# Patient Record
Sex: FEMALE | Race: WHITE
[De-identification: names, ages, dates, MRNs, and addresses within clinical notes are randomized per-mention and may not be internally consistent; named-entity substitution may affect disease eponyms.]

---

## 2017-09-30 ENCOUNTER — HOSPITAL ENCOUNTER (EMERGENCY)
Dept: HOSPITAL 17 - NEPK | Age: 42
Discharge: LEFT BEFORE DISCHARGE | End: 2017-09-30
Payer: SELF-PAY

## 2017-09-30 VITALS — WEIGHT: 154.32 LBS | HEIGHT: 65 IN | BODY MASS INDEX: 25.71 KG/M2

## 2017-09-30 VITALS
OXYGEN SATURATION: 98 % | RESPIRATION RATE: 15 BRPM | TEMPERATURE: 98.8 F | DIASTOLIC BLOOD PRESSURE: 67 MMHG | HEART RATE: 84 BPM | SYSTOLIC BLOOD PRESSURE: 145 MMHG

## 2017-09-30 DIAGNOSIS — M79.7: ICD-10-CM

## 2017-09-30 DIAGNOSIS — E11.40: Primary | ICD-10-CM

## 2017-09-30 PROCEDURE — 99281 EMR DPT VST MAYX REQ PHY/QHP: CPT

## 2017-09-30 NOTE — PD
HPI


Chief Complaint:  Pain: Acute or Chronic


Time Seen by Provider:  19:41


Travel History


International Travel<30 days:  No


Contact w/Intl Traveler<30days:  No


Traveled to known affect area:  No





History of Present Illness


HPI


42-year-old white female presents to emergency department complaining of 

neuropathy in her feet.  She states that she moved from [facility] 

to [place] 2 months ago.  She has no insurance or no doctor.  Patient 

states that she also has a history of fibromyalgia.  She has pain and altered 

sensation in her feet.  She states that she was on 15 mg of oxycodone twice 

daily.  She states that she takes no medication at this time.  He states that 

she has diet-controlled diabetes.  She also had received chemotherapy several 

years ago due to a bladder cancer.  She is currently in remission.  She has had 

no recent illness or injuries.  Pain is mild-to-moderate





History


Past Medical Histgory


*** Narrative Medical


Diet-controlled diabetes, fibromyalgia, history of bladder cancer, neuropathy


Tetanus Vaccination:  < 5 Years


Pregnant?:  Not Pregnant





Past Surgical History


*** Narrative Surgical


Hysterectomy, cystoscopy with laser of bladder tumor





Social History


Alcohol Use:  No


Tobacco Use:  No





Review of Systems


Except as stated in HPI:  all other systems reviewed are Neg





Physical Exam


Narrative


GENERAL:  Well-developed, well-nourished in no acute distress. Nontoxic 

appearing.


HEAD: Normocephalic, atraumatic.


EYES: Pupils equal round and reactive. Extraocular motions intact. No scleral 

icterus. No injection or drainage. 


ENT: TMs clear without erythema.  The external auditory canals clear.  Nose: 

clear .  Posterior pharynx is pink and moist.  No tonsillar edema or exudate.  

Uvula midline. Airway patent.


NECK: Trachea midline.Supple, nontender, moves head freely.  No central bony 

tenderness or spasm.


CARDIOVASCULAR: Regular rate and rhythm without murmurs, gallops, or rubs. 


RESPIRATORY: Clear to auscultation. Breath sounds equal bilaterally. No wheezes

, rales, or rhonchi.  


GASTROINTESTINAL: Abdomen soft, non-tender, nondistended. No hepato-splenomegaly

, or palpable masses. No guarding.


EXTREMITIES: No clubbing, cyanosis, or edema. No joint tenderness, effusion, or 

edema noted.  Patient has intact gross sensation.  There is no erythema or 

warmth.  She has intact pulses with good Refill.


BACK: Nontender without deformity or crepitance. No flank tenderness.





Data


Data


Last Documented VS





Vital Signs








  Date Time  Temp Pulse Resp B/P (MAP) Pulse Ox O2 Delivery O2 Flow Rate FiO2


 


9/30/17 19:01 98.8 84 15 145/67 (93) 98   











MDM


Medical Screen Exam Complete:  Yes


Emergency Medical Condition:  No


Differential Diagnosis


Differential diagnosis: Fibromyalgia, chronic pain, neuropathy, malingering


Narrative Course


A medical screening exam was performed: 


At the time of evaluation the presenting medical condition was determined not 

to be of an emergent nature. 


The patient was given the option of receiving additional care, but declined. 


Patient was given options for additional community resources from which to 

obtain care.


The Patient Has Been advised to seek medical attention for their presenting 

complaint.


The patient has been advised to return to the ER at any time if an emergent 

condition develops.





 Primary Impression:  


 Encounter for medical screening examination


Condition:  Marlo Art Sep 30, 2017 19:50

## 2017-12-05 ENCOUNTER — HOSPITAL ENCOUNTER (EMERGENCY)
Dept: HOSPITAL 17 - PHED | Age: 42
Discharge: HOME | End: 2017-12-05
Payer: SELF-PAY

## 2017-12-05 VITALS
TEMPERATURE: 100.3 F | RESPIRATION RATE: 16 BRPM | SYSTOLIC BLOOD PRESSURE: 137 MMHG | HEART RATE: 92 BPM | DIASTOLIC BLOOD PRESSURE: 65 MMHG | OXYGEN SATURATION: 99 %

## 2017-12-05 VITALS — BODY MASS INDEX: 27.93 KG/M2 | WEIGHT: 157.63 LBS | HEIGHT: 63 IN

## 2017-12-05 DIAGNOSIS — L02.413: Primary | ICD-10-CM

## 2017-12-05 DIAGNOSIS — B34.9: ICD-10-CM

## 2017-12-05 PROCEDURE — 99283 EMERGENCY DEPT VISIT LOW MDM: CPT

## 2017-12-05 NOTE — PD
HPI


Chief Complaint:  Skin Problem


Time Seen by Provider:  17:37


Travel History


International Travel<30 days:  No


Contact w/Intl Traveler<30days:  No


Traveled to known affect area:  No





History of Present Illness


HPI


42-year-old female here with sore throat, nasal congestion, cough and possible 

abscess to the right shoulder.  She reports that upper respiratory symptoms 

started approximately 3 days ago.  Her spouse has similar flulike symptoms.  

She noticed the abscess to her right shoulder approximately 5 days ago and it 

began to spontaneously drain one day ago.  She reports history of multiple 

abscesses in the past.  Denies IV drug use.  She denies fever or chills, 

headache, neck pain, chest pain, shortness of breath, abdominal pain.  Symptoms 

severity moderate.  She has not taken any over-the-counter medications.





PFSH


Past Medical History


Medical History:  Denies Significant Hx


Pregnant?:  Not Pregnant





Past Surgical History


Hysterectomy:  Yes





Social History


Alcohol Use:  No


Tobacco Use:  No





Allergies-Medications


(Allergen,Severity, Reaction):  


Coded Allergies:  


     povidone-iodine (Verified  Allergy, Unknown, 12/5/17)


     soap (Verified  Allergy, Unknown, 12/5/17)


Reported Meds & Prescriptions





Reported Meds & Active Scripts


Active


Bactrim DS (Sulfamethoxazole-Trimethoprim) 800-160 Mg Tab 1 Tab PO BID








Review of Systems


Except as stated in HPI:  all other systems reviewed are Neg





Physical Exam


Narrative


GENERAL: Alert female in no distress.


SKIN: Warm and dry.  1 cm area of erythema, induration with central scab with 

small amount of purulent drainage.


HEAD: Normocephalic.


EYES: No scleral icterus. No injection or drainage. 


MOUTH: Pharyngeal erythema without tonsillar hypertrophy or exudate.


NECK: Supple, trachea midline. No JVD or lymphadenopathy.  No meningismus.


CARDIOVASCULAR: Regular rate and rhythm without murmurs, gallops, or rubs. 


RESPIRATORY: Breath sounds equal bilaterally. No accessory muscle use.  

Coughing throughout the exam


GASTROINTESTINAL: Abdomen soft, non-tender, nondistended. 


MUSCULOSKELETAL: No cyanosis, or edema.  Right anterior shoulder: 1 cm area of 

erythema, induration with central scab with small amount of purulent drainage.  

No warmth, erythema, swelling of the shoulder joint.


BACK: Nontender without obvious deformity. No CVA tenderness.








Data


Data


Last Documented VS





Vital Signs








  Date Time  Temp Pulse Resp B/P (MAP) Pulse Ox O2 Delivery O2 Flow Rate FiO2


 


12/5/17 16:51 100.3 92 16 137/65 (89) 99   











MDM


Medical Decision Making


Medical Screen Exam Complete:  Yes


Emergency Medical Condition:  Yes


Differential Diagnosis


Influenza, abscess, cellulitis


Narrative Course


42-year-old female here for evaluation of URI/influenza-like symptoms as well 

as a draining abscess to her right anterior upper extremity.  URI symptoms 

present for approximately 3 days.  Abscess present for approximately 5 days and 

spontaneously drained yesterday.  She was concerned the area was a spider bite 

which prompted her visit today.  She is well-appearing.  She was found to be 

febrile at 100.3 in triage.  I believe this is due to the viral illness not the 

abscess.  The abscess itself is quite small located to the anterior proximal 

humerus.  No joint involvement suspected.  The area spontaneously drained on 

its own.  It is indurated at this time.  She'll be on antibiotics and discuss 

symptomatic treatment for viral URI.





Diagnosis





 Primary Impression:  


 Abscess


 Additional Impression:  


 Viral illness


Referrals:  


[facility]





***Additional Instructions:  


Take over-the-counter Tylenol or ibuprofen as needed for fever.


Apply warm compresses to the abscess several times per day.


Follow-up with your primary doctor return to emergency department for recheck.


Scripts


Sulfamethoxazole-Trimethoprim (Bactrim DS) 800-160 Mg Tab


1 TAB PO BID for Infection, #20 TAB 0 Refills


   Prov: Karyna Puentes         12/5/17


Disposition:  01 DISCHARGE HOME


Condition:  Stable











Karyna Puentes Dec 5, 2017 17:47

## 2018-06-04 ENCOUNTER — HOSPITAL ENCOUNTER (EMERGENCY)
Dept: HOSPITAL 17 - PHED | Age: 43
Discharge: HOME | End: 2018-06-04
Payer: SELF-PAY

## 2018-06-04 VITALS — HEIGHT: 63 IN | BODY MASS INDEX: 25.98 KG/M2 | WEIGHT: 146.61 LBS

## 2018-06-04 VITALS — SYSTOLIC BLOOD PRESSURE: 138 MMHG | DIASTOLIC BLOOD PRESSURE: 69 MMHG

## 2018-06-04 VITALS
RESPIRATION RATE: 18 BRPM | HEART RATE: 82 BPM | SYSTOLIC BLOOD PRESSURE: 144 MMHG | OXYGEN SATURATION: 99 % | TEMPERATURE: 98.9 F | DIASTOLIC BLOOD PRESSURE: 65 MMHG

## 2018-06-04 DIAGNOSIS — R11.2: ICD-10-CM

## 2018-06-04 DIAGNOSIS — Z85.51: ICD-10-CM

## 2018-06-04 DIAGNOSIS — F17.200: ICD-10-CM

## 2018-06-04 DIAGNOSIS — R33.9: Primary | ICD-10-CM

## 2018-06-04 DIAGNOSIS — M79.7: ICD-10-CM

## 2018-06-04 DIAGNOSIS — E11.9: ICD-10-CM

## 2018-06-04 LAB
ALBUMIN SERPL-MCNC: 3.6 GM/DL (ref 3.4–5)
ALP SERPL-CCNC: 44 U/L (ref 45–117)
ALT SERPL-CCNC: 15 U/L (ref 10–53)
AST SERPL-CCNC: 12 U/L (ref 15–37)
BACTERIA #/AREA URNS HPF: (no result) /HPF
BASOPHILS # BLD AUTO: 0.1 TH/MM3 (ref 0–0.2)
BASOPHILS NFR BLD: 1.3 % (ref 0–2)
BILIRUB SERPL-MCNC: 0.3 MG/DL (ref 0.2–1)
BUN SERPL-MCNC: 11 MG/DL (ref 7–18)
CALCIUM SERPL-MCNC: 8.5 MG/DL (ref 8.5–10.1)
CHLORIDE SERPL-SCNC: 110 MEQ/L (ref 98–107)
COLOR UR: YELLOW
CREAT SERPL-MCNC: 0.85 MG/DL (ref 0.5–1)
EOSINOPHIL # BLD: 0.2 TH/MM3 (ref 0–0.4)
EOSINOPHIL NFR BLD: 2.2 % (ref 0–4)
ERYTHROCYTE [DISTWIDTH] IN BLOOD BY AUTOMATED COUNT: 12.2 % (ref 11.6–17.2)
GFR SERPLBLD BASED ON 1.73 SQ M-ARVRAT: 73 ML/MIN (ref 89–?)
GLUCOSE SERPL-MCNC: 82 MG/DL (ref 74–106)
GLUCOSE UR STRIP-MCNC: (no result) MG/DL
HCO3 BLD-SCNC: 25.9 MEQ/L (ref 21–32)
HCT VFR BLD CALC: 36.5 % (ref 35–46)
HGB BLD-MCNC: 12.9 GM/DL (ref 11.6–15.3)
HGB UR QL STRIP: (no result)
INR PPP: 1.1 RATIO
KETONES UR STRIP-MCNC: (no result) MG/DL
LEUKOCYTE ESTERASE UR QL STRIP: (no result) /HPF (ref 0–5)
LYMPHOCYTES # BLD AUTO: 2.8 TH/MM3 (ref 1–4.8)
LYMPHOCYTES NFR BLD AUTO: 24.5 % (ref 9–44)
MCH RBC QN AUTO: 32.3 PG (ref 27–34)
MCHC RBC AUTO-ENTMCNC: 35.4 % (ref 32–36)
MCV RBC AUTO: 91.3 FL (ref 80–100)
MONOCYTE #: 0.6 TH/MM3 (ref 0–0.9)
MONOCYTES NFR BLD: 5.6 % (ref 0–8)
NEUTROPHILS # BLD AUTO: 7.6 TH/MM3 (ref 1.8–7.7)
NEUTROPHILS NFR BLD AUTO: 66.4 % (ref 16–70)
NITRITE UR QL STRIP: (no result)
PLATELET # BLD: 350 TH/MM3 (ref 150–450)
PMV BLD AUTO: 7.6 FL (ref 7–11)
PROT SERPL-MCNC: 6.7 GM/DL (ref 6.4–8.2)
PROTHROMBIN TIME: 11.3 SEC (ref 9.8–11.6)
RBC # BLD AUTO: 4 MIL/MM3 (ref 4–5.3)
RBC #/AREA URNS HPF: (no result) /HPF (ref 0–3)
SODIUM SERPL-SCNC: 142 MEQ/L (ref 136–145)
SP GR UR STRIP: 1.01 (ref 1–1.03)
SQUAMOUS #/AREA URNS HPF: (no result) /HPF (ref 0–5)
TROPONIN I SERPL-MCNC: (no result) NG/ML (ref 0.02–0.05)
URINE LEUKOCYTE ESTERASE: (no result)
WBC # BLD AUTO: 11.3 TH/MM3 (ref 4–11)

## 2018-06-04 PROCEDURE — C9113 INJ PANTOPRAZOLE SODIUM, VIA: HCPCS

## 2018-06-04 PROCEDURE — 85610 PROTHROMBIN TIME: CPT

## 2018-06-04 PROCEDURE — 96374 THER/PROPH/DIAG INJ IV PUSH: CPT

## 2018-06-04 PROCEDURE — 85730 THROMBOPLASTIN TIME PARTIAL: CPT

## 2018-06-04 PROCEDURE — 51702 INSERT TEMP BLADDER CATH: CPT

## 2018-06-04 PROCEDURE — 83690 ASSAY OF LIPASE: CPT

## 2018-06-04 PROCEDURE — 96375 TX/PRO/DX INJ NEW DRUG ADDON: CPT

## 2018-06-04 PROCEDURE — 74176 CT ABD & PELVIS W/O CONTRAST: CPT

## 2018-06-04 PROCEDURE — 80053 COMPREHEN METABOLIC PANEL: CPT

## 2018-06-04 PROCEDURE — 96361 HYDRATE IV INFUSION ADD-ON: CPT

## 2018-06-04 PROCEDURE — 99284 EMERGENCY DEPT VISIT MOD MDM: CPT

## 2018-06-04 PROCEDURE — 85025 COMPLETE CBC W/AUTO DIFF WBC: CPT

## 2018-06-04 PROCEDURE — 84484 ASSAY OF TROPONIN QUANT: CPT

## 2018-06-04 PROCEDURE — 81001 URINALYSIS AUTO W/SCOPE: CPT

## 2018-06-04 NOTE — PD
HPI


Chief Complaint:  GI Complaint


Time Seen by Provider:  20:36


Travel History


International Travel<30 days:  No


Contact w/Intl Traveler<30days:  No


Traveled to known affect area:  No





History of Present Illness


HPI


This is a 43-year-old female presented the ER for evaluation of urinary 

retention for the last day.  Patient has a history of "bladder cancer" was 

diagnosed 2015 and received "laser treatment" and 4 months of chemotherapy.  

Patient states she has been cancer free since 2015 but she is worried about 

having to consider back since she is unable to urinate since yesterday.  Prior 

to yesterday she had her urine was normal with no blood or pain or burning 

urination.  Patient has been complaining also of nausea and vomiting but no 

abdominal pain or fever or chills or night sweats.





PFSH


Past Medical History


Autoimmune Disease:  Yes (fibromyalgia)


Cancer:  Yes (female organs and bladder)


Cardiovascular Problems:  Yes (MURMUR)


Diabetes:  Yes (DIET CONTROLLED)


Patient Takes Glucophage:  No


Diminished Hearing:  No


Tetanus Vaccination:  > 5 Years


Influenza Vaccination:  No


Pregnant?:  Not Pregnant


LMP:  10/14/2014


Tubal Ligation:  Yes





Past Surgical History


Gynecologic Surgery:  Yes (cryotherapy)


Hysterectomy:  Yes





Social History


Alcohol Use:  Yes (socially)


Tobacco Use:  Yes (1 ppd)


Substance Use:  No





Allergies-Medications


(Allergen,Severity, Reaction):  


Coded Allergies:  


     povidone-iodine (Verified  Allergy, Unknown, 6/4/18)


     soap (Verified  Allergy, Unknown, 6/4/18)


     acetaminophen (Verified  Adverse Reaction, Unknown, VOMITING, 6/4/18)


Reported Meds & Prescriptions





Reported Meds & Active Scripts


Active


Bactrim DS (Sulfamethoxazole-Trimethoprim) 800-160 Mg Tab 1 Tab PO BID








Review of Systems


Except as stated in HPI:  all other systems reviewed are Neg





Physical Exam


Narrative


GENERAL: Alert oriented 3 no acute distress


SKIN: Focused skin assessment warm/dry.


HEAD: Atraumatic. Normocephalic. 


EYES: Pupils equal and round. No scleral icterus. No injection or drainage. 


ENT: No nasal bleeding or discharge.  Mucous membranes pink and moist.


NECK: Trachea midline. No JVD. 


CARDIOVASCULAR: Regular rate and rhythm.  No murmur appreciated.


RESPIRATORY: No accessory muscle use. Clear to auscultation. Breath sounds 

equal bilaterally. 


GASTROINTESTINAL: Abdomen soft, non-tender, nondistended. Hepatic and splenic 

margins not palpable. 


MUSCULOSKELETAL: No obvious deformities. No clubbing.  No cyanosis.  No edema. 


NEUROLOGICAL: Awake and alert. No obvious cranial nerve deficits.  Motor 

grossly within normal limits. Normal speech.


PSYCHIATRIC: Appropriate mood and affect; insight and judgment normal.





Data


Data


Last Documented VS





Vital Signs








  Date Time  Temp Pulse Resp B/P (MAP) Pulse Ox O2 Delivery O2 Flow Rate FiO2


 


6/4/18 20:48   18     


 


6/4/18 19:44 98.9 82  144/65 (91) 99   








Orders





 Orders


Comprehensive Metabolic Panel (6/4/18 20:46)


Complete Blood Count With Diff (6/4/18 20:46)


Act Partial Throm Time (Ptt) (6/4/18 20:46)


Prothrombin Time / Inr (Pt) (6/4/18 20:46)


Lipase (6/4/18 20:46)


Troponin I (6/4/18 20:46)


Urinalysis - C+S If Indicated (6/4/18 20:46)


Sodium Chlor 0.9% 1000 Ml Inj (Ns 1000 M (6/4/18 21:00)


Pantoprazole Inj (Protonix Inj) (6/4/18 21:00)


Ondansetron Inj (Zofran Inj) (6/4/18 21:00)


Ct Abd/Pel W/O Iv Contrast (6/4/18 )


Urinary Catheter Management CARMEN.Q8H (6/4/18 22:33)


Ibuprofen (Motrin) (6/4/18 22:45)





Labs





Laboratory Tests








Test


  6/4/18


21:00 6/4/18


22:54


 


White Blood Count 11.3 TH/MM3  


 


Red Blood Count 4.00 MIL/MM3  


 


Hemoglobin 12.9 GM/DL  


 


Hematocrit 36.5 %  


 


Mean Corpuscular Volume 91.3 FL  


 


Mean Corpuscular Hemoglobin 32.3 PG  


 


Mean Corpuscular Hemoglobin


Concent 35.4 % 


  


 


 


Red Cell Distribution Width 12.2 %  


 


Platelet Count 350 TH/MM3  


 


Mean Platelet Volume 7.6 FL  


 


Neutrophils (%) (Auto) 66.4 %  


 


Lymphocytes (%) (Auto) 24.5 %  


 


Monocytes (%) (Auto) 5.6 %  


 


Eosinophils (%) (Auto) 2.2 %  


 


Basophils (%) (Auto) 1.3 %  


 


Neutrophils # (Auto) 7.6 TH/MM3  


 


Lymphocytes # (Auto) 2.8 TH/MM3  


 


Monocytes # (Auto) 0.6 TH/MM3  


 


Eosinophils # (Auto) 0.2 TH/MM3  


 


Basophils # (Auto) 0.1 TH/MM3  


 


CBC Comment DIFF FINAL  


 


Differential Comment   


 


Prothrombin Time 11.3 SEC  


 


Prothromb Time International


Ratio 1.1 RATIO 


  


 


 


Activated Partial


Thromboplast Time 26.8 SEC 


  


 


 


Blood Urea Nitrogen 11 MG/DL  


 


Creatinine 0.85 MG/DL  


 


Random Glucose 82 MG/DL  


 


Total Protein 6.7 GM/DL  


 


Albumin 3.6 GM/DL  


 


Calcium Level 8.5 MG/DL  


 


Alkaline Phosphatase 44 U/L  


 


Aspartate Amino Transf


(AST/SGOT) 12 U/L 


  


 


 


Alanine Aminotransferase


(ALT/SGPT) 15 U/L 


  


 


 


Total Bilirubin 0.3 MG/DL  


 


Sodium Level 142 MEQ/L  


 


Potassium Level 3.5 MEQ/L  


 


Chloride Level 110 MEQ/L  


 


Carbon Dioxide Level 25.9 MEQ/L  


 


Anion Gap 6 MEQ/L  


 


Estimat Glomerular Filtration


Rate 73 ML/MIN 


  


 


 


Troponin I


  LESS THAN 0.02


NG/ML 


 


 


Lipase 112 U/L  


 


Urine Color  YELLOW 


 


Urine Turbidity  CLEAR 


 


Urine pH  5.0 


 


Urine Specific Gravity  1.010 


 


Urine Protein  NEG mg/dL 


 


Urine Glucose (UA)  NEG mg/dL 


 


Urine Ketones  NEG mg/dL 


 


Urine Occult Blood  NEG 


 


Urine Nitrite  NEG 


 


Urine Bilirubin  NEG 


 


Urine Urobilinogen  0.2 MG/DL 


 


Urine Leukocyte Esterase  NEG 


 


Urine RBC  0-2 /hpf 


 


Urine WBC  0-2 /hpf 


 


Urine Squamous Epithelial


Cells 


  0-5 /hpf 


 


 


Urine Bacteria  NONE /hpf 


 


Microscopic Urinalysis Comment


  


  CULT NOT


INDICATED











MDM


Medical Decision Making


Medical Screen Exam Complete:  Yes


Emergency Medical Condition:  Yes


Differential Diagnosis


The urinary retention, bladder cancer, nephrolithiasis.


Narrative Course


43-year-old female here for evaluation of urinary retention.  Physical 

examination is unremarkable, vitals are stable, patient was hesitant to let the 

nurse put the a urinary catheter in but she finally agreed to put a cast.,  

Urinalysis is negative, labs are within normal limits except for mildly 

elevated white blood count.  CAT scan shows normal evidence of urinary bladder 

mass or abnormalities.  There is no hematuria.  The urine output in the 

catheter is about 150ml.  I told the patient that she will need to keep the 

urine catheter in otherwise she might continue to have urinary retention but 

patient strongly refused to be discharged with a urinary catheter.  I 

instructed the patient she can do self-catheterization if she have to and 

patient says that she has done that in the past.  I also gave the patient 

clinic inflammation of his iliac clinic so that she can follow-up with them 

meanwhile if her symptoms change or do not improve she will have to come to the 

ER for further evaluation.  For now there is no evidence of cancer in or any 

other abnormalities that warrant admission.


 Laboratory Tests








Test


  6/4/18


21:00 6/4/18


22:54


 


White Blood Count 11.3 TH/MM3  


 


Red Blood Count 4.00 MIL/MM3  


 


Hemoglobin 12.9 GM/DL  


 


Hematocrit 36.5 %  


 


Mean Corpuscular Volume 91.3 FL  


 


Mean Corpuscular Hemoglobin 32.3 PG  


 


Mean Corpuscular Hemoglobin


Concent 35.4 % 


  


 


 


Red Cell Distribution Width 12.2 %  


 


Platelet Count 350 TH/MM3  


 


Mean Platelet Volume 7.6 FL  


 


Neutrophils (%) (Auto) 66.4 %  


 


Lymphocytes (%) (Auto) 24.5 %  


 


Monocytes (%) (Auto) 5.6 %  


 


Eosinophils (%) (Auto) 2.2 %  


 


Basophils (%) (Auto) 1.3 %  


 


Neutrophils # (Auto) 7.6 TH/MM3  


 


Lymphocytes # (Auto) 2.8 TH/MM3  


 


Monocytes # (Auto) 0.6 TH/MM3  


 


Eosinophils # (Auto) 0.2 TH/MM3  


 


Basophils # (Auto) 0.1 TH/MM3  


 


CBC Comment DIFF FINAL  


 


Differential Comment   


 


Prothrombin Time 11.3 SEC  


 


Prothromb Time International


Ratio 1.1 RATIO 


  


 


 


Activated Partial


Thromboplast Time 26.8 SEC 


  


 


 


Blood Urea Nitrogen 11 MG/DL  


 


Creatinine 0.85 MG/DL  


 


Random Glucose 82 MG/DL  


 


Total Protein 6.7 GM/DL  


 


Albumin 3.6 GM/DL  


 


Calcium Level 8.5 MG/DL  


 


Alkaline Phosphatase 44 U/L  


 


Aspartate Amino Transf


(AST/SGOT) 12 U/L 


  


 


 


Alanine Aminotransferase


(ALT/SGPT) 15 U/L 


  


 


 


Total Bilirubin 0.3 MG/DL  


 


Sodium Level 142 MEQ/L  


 


Potassium Level 3.5 MEQ/L  


 


Chloride Level 110 MEQ/L  


 


Carbon Dioxide Level 25.9 MEQ/L  


 


Anion Gap 6 MEQ/L  


 


Estimat Glomerular Filtration


Rate 73 ML/MIN 


  


 


 


Troponin I


  LESS THAN 0.02


NG/ML 


 


 


Lipase 112 U/L  


 


Urine Color  YELLOW 


 


Urine Turbidity  CLEAR 


 


Urine pH  5.0 


 


Urine Specific Gravity  1.010 


 


Urine Protein  NEG mg/dL 


 


Urine Glucose (UA)  NEG mg/dL 


 


Urine Ketones  NEG mg/dL 


 


Urine Occult Blood  NEG 


 


Urine Nitrite  NEG 


 


Urine Bilirubin  NEG 


 


Urine Urobilinogen  0.2 MG/DL 


 


Urine Leukocyte Esterase  NEG 


 


Urine RBC  0-2 /hpf 


 


Urine WBC  0-2 /hpf 


 


Urine Squamous Epithelial


Cells 


  0-5 /hpf 


 


 


Urine Bacteria  NONE /hpf 


 


Microscopic Urinalysis Comment


  


  CULT NOT


INDICATED








Last 24 hours Impressions








Abdomen/Pelvis CT 6/4/18 0000 Signed





Impressions: 





 CONCLUSION:





 1.  No acute findings. Hepatomegaly. No renal calculi or obstructive uropathy.





  





 











Diagnosis





 Primary Impression:  


 Urinary retention


Referrals:  


[facility]





***Additional Instructions:  


Follow-up with primary care physician and return here if symptoms change or do 

not improve.


Scripts


Ondansetron Odt (Zofran Odt) 4 Mg Tab


4 MG SL Q12HR Y for Nausea/Vomiting, #30 TAB 0 Refills


   Prov: Shyam Wu MD         6/4/18


Disposition:  01 DISCHARGE HOME


Condition:  Stable











Shyam Wu MD Jun 4, 2018 23:30

## 2018-06-04 NOTE — RADRPT
EXAM DATE:  6/4/2018 9:36 PM EDT

AGE/SEX:        43 years / Female



INDICATIONS:  Bilateral lower quadrant pain. Difficulty urinating. Loss of appetite.



CLINICAL DATA:  This is the patient's initial encounter. Patient reports that signs and symptoms have
 been present for 3 days and indicates a pain score of 7/10. 

                                                                          

MEDICAL/SURGICAL HISTORY:       Carcinoma, bladder.  Diabetes. Hysterectomy.  Tubal ligation. Bladder
.



RADIATION DOSE:  7.12 CTDI (mGy)









COMPARISON:      No prior [facility] exams available for comparison. 





TECHNIQUE:  Multiple contiguous axial images were obtained through the abdomen. Images were obtained 
using multiple row detector helical technique. Using dose reduction techniques, radiation dose was ke
pt as low as reasonably achievable to obtain optimal diagnostic quality images. 



FINDINGS: 

Lung bases are clear. No acute findings in the liver, spleen, adrenals, kidneys or pancreas. No calci
fied gallstones or biliary ductal dilatation. The liver is enlarged to 20.8 cm in length.



No acute bony abnormalities. Degenerative changes at the pubic symphysis.



CONCLUSION:

1.  No acute findings. Hepatomegaly. No renal calculi or obstructive uropathy.



Electronically signed by: Marlo Chanel MD  6/4/2018 9:55 PM EDT